# Patient Record
Sex: FEMALE | Race: WHITE | NOT HISPANIC OR LATINO
[De-identification: names, ages, dates, MRNs, and addresses within clinical notes are randomized per-mention and may not be internally consistent; named-entity substitution may affect disease eponyms.]

---

## 2019-12-12 PROBLEM — Z00.00 ENCOUNTER FOR PREVENTIVE HEALTH EXAMINATION: Status: ACTIVE | Noted: 2019-12-12

## 2019-12-13 ENCOUNTER — APPOINTMENT (OUTPATIENT)
Dept: GYNECOLOGIC ONCOLOGY | Facility: CLINIC | Age: 59
End: 2019-12-13
Payer: COMMERCIAL

## 2019-12-13 VITALS
SYSTOLIC BLOOD PRESSURE: 142 MMHG | BODY MASS INDEX: 38.28 KG/M2 | DIASTOLIC BLOOD PRESSURE: 82 MMHG | TEMPERATURE: 98.5 F | WEIGHT: 208 LBS | HEIGHT: 62 IN

## 2019-12-13 DIAGNOSIS — Z86.79 PERSONAL HISTORY OF OTHER DISEASES OF THE CIRCULATORY SYSTEM: ICD-10-CM

## 2019-12-13 DIAGNOSIS — Z86.19 PERSONAL HISTORY OF OTHER INFECTIOUS AND PARASITIC DISEASES: ICD-10-CM

## 2019-12-13 DIAGNOSIS — Z78.9 OTHER SPECIFIED HEALTH STATUS: ICD-10-CM

## 2019-12-13 PROCEDURE — 99204 OFFICE O/P NEW MOD 45 MIN: CPT

## 2019-12-13 RX ORDER — LISINOPRIL 30 MG/1
TABLET ORAL
Refills: 0 | Status: ACTIVE | COMMUNITY

## 2019-12-13 RX ORDER — AMLODIPINE BESYLATE 5 MG/1
5 TABLET ORAL
Refills: 0 | Status: ACTIVE | COMMUNITY

## 2019-12-13 NOTE — HISTORY OF PRESENT ILLNESS
[FreeTextEntry1] : Patient is a 59-year-old female  ( x3, TOP x1) referred by Dr. Bonilla with a large pelvic mass, and right lower quadrant pain. The patient states that she's been feeling crampy for the past several months and was referred to Dr. Bonilla by  her GI doctor after her workup did not reveal any gastrointestinal reason for her symptoms. She denies any  vaginal bleeding, and  is requesting further management.

## 2019-12-13 NOTE — OB HISTORY
[Total Preg ___] : : [unfilled] [Full Term ___] : [unfilled] (full-term) [Living ___] : [unfilled] (living) [ ___] : [unfilled]  section delivery(s) [Menarche Age ____] : age at menarche was [unfilled] [AB Induced ___] : [unfilled] elective (s) [Menopause  Age ____] : menopause occurred at age [unfilled]

## 2019-12-13 NOTE — DISCUSSION/SUMMARY
[FreeTextEntry1] : Patient is a 59-year-old female  ( x3, TOP x1) referred by Dr. Bonilla with a large pelvic mass, and right lower quadrant pain. The patient states that she's been feeling crampy for the past several months. her Ca-125 is 12, and her CEA is 1.5.  She denies any  vaginal bleeding, and  is requesting further management.\par \par Options for surgical management discussed. Procedures offered patient included Exploratory Laparotomy, ZADIA,BSO,possible staging.\par \par The risk benefits and alternative to the recommended treatments were discussed. She was informed about potential complications of surgery including but not limited to bowel, bladder, and ureteral injuries. Infectious morbidity, along with bleeding and thromboembolic events were also discussed.  \par \par She showed clear understanding, was given the opportunity to ask questions and is amenable to the above surgical treatment. The patient will be setup for the above procedure in the near future.\par

## 2019-12-17 ENCOUNTER — OUTPATIENT (OUTPATIENT)
Dept: OUTPATIENT SERVICES | Facility: HOSPITAL | Age: 59
LOS: 1 days | Discharge: HOME | End: 2019-12-17
Payer: COMMERCIAL

## 2019-12-17 VITALS
OXYGEN SATURATION: 100 % | DIASTOLIC BLOOD PRESSURE: 88 MMHG | HEIGHT: 62 IN | SYSTOLIC BLOOD PRESSURE: 150 MMHG | HEART RATE: 72 BPM | TEMPERATURE: 98 F | WEIGHT: 207.23 LBS | RESPIRATION RATE: 18 BRPM

## 2019-12-17 DIAGNOSIS — R19.00 INTRA-ABDOMINAL AND PELVIC SWELLING, MASS AND LUMP, UNSPECIFIED SITE: ICD-10-CM

## 2019-12-17 DIAGNOSIS — Z01.818 ENCOUNTER FOR OTHER PREPROCEDURAL EXAMINATION: ICD-10-CM

## 2019-12-17 DIAGNOSIS — Z98.890 OTHER SPECIFIED POSTPROCEDURAL STATES: Chronic | ICD-10-CM

## 2019-12-17 LAB
ALBUMIN SERPL ELPH-MCNC: 4.4 G/DL — SIGNIFICANT CHANGE UP (ref 3.5–5.2)
ALP SERPL-CCNC: 39 U/L — SIGNIFICANT CHANGE UP (ref 30–115)
ALT FLD-CCNC: 25 U/L — SIGNIFICANT CHANGE UP (ref 0–41)
ANION GAP SERPL CALC-SCNC: 15 MMOL/L — HIGH (ref 7–14)
APPEARANCE UR: CLEAR — SIGNIFICANT CHANGE UP
APTT BLD: 31 SEC — SIGNIFICANT CHANGE UP (ref 27–39.2)
AST SERPL-CCNC: 19 U/L — SIGNIFICANT CHANGE UP (ref 0–41)
BASOPHILS # BLD AUTO: 0.05 K/UL — SIGNIFICANT CHANGE UP (ref 0–0.2)
BASOPHILS NFR BLD AUTO: 0.8 % — SIGNIFICANT CHANGE UP (ref 0–1)
BILIRUB SERPL-MCNC: 0.6 MG/DL — SIGNIFICANT CHANGE UP (ref 0.2–1.2)
BILIRUB UR-MCNC: NEGATIVE — SIGNIFICANT CHANGE UP
BLD GP AB SCN SERPL QL: SIGNIFICANT CHANGE UP
BUN SERPL-MCNC: 18 MG/DL — SIGNIFICANT CHANGE UP (ref 10–20)
CALCIUM SERPL-MCNC: 9.4 MG/DL — SIGNIFICANT CHANGE UP (ref 8.5–10.1)
CHLORIDE SERPL-SCNC: 100 MMOL/L — SIGNIFICANT CHANGE UP (ref 98–110)
CO2 SERPL-SCNC: 25 MMOL/L — SIGNIFICANT CHANGE UP (ref 17–32)
COLOR SPEC: YELLOW — SIGNIFICANT CHANGE UP
CREAT SERPL-MCNC: 0.6 MG/DL — LOW (ref 0.7–1.5)
DIFF PNL FLD: NEGATIVE — SIGNIFICANT CHANGE UP
EOSINOPHIL # BLD AUTO: 0.09 K/UL — SIGNIFICANT CHANGE UP (ref 0–0.7)
EOSINOPHIL NFR BLD AUTO: 1.5 % — SIGNIFICANT CHANGE UP (ref 0–8)
ESTIMATED AVERAGE GLUCOSE: 120 MG/DL — HIGH (ref 68–114)
GLUCOSE SERPL-MCNC: 114 MG/DL — HIGH (ref 70–99)
GLUCOSE UR QL: NEGATIVE — SIGNIFICANT CHANGE UP
HBA1C BLD-MCNC: 5.8 % — HIGH (ref 4–5.6)
HCT VFR BLD CALC: 42.2 % — SIGNIFICANT CHANGE UP (ref 37–47)
HGB BLD-MCNC: 13.8 G/DL — SIGNIFICANT CHANGE UP (ref 12–16)
IMM GRANULOCYTES NFR BLD AUTO: 0.3 % — SIGNIFICANT CHANGE UP (ref 0.1–0.3)
INR BLD: 1.05 RATIO — SIGNIFICANT CHANGE UP (ref 0.65–1.3)
KETONES UR-MCNC: NEGATIVE — SIGNIFICANT CHANGE UP
LEUKOCYTE ESTERASE UR-ACNC: NEGATIVE — SIGNIFICANT CHANGE UP
LYMPHOCYTES # BLD AUTO: 1.91 K/UL — SIGNIFICANT CHANGE UP (ref 1.2–3.4)
LYMPHOCYTES # BLD AUTO: 32 % — SIGNIFICANT CHANGE UP (ref 20.5–51.1)
MCHC RBC-ENTMCNC: 29.1 PG — SIGNIFICANT CHANGE UP (ref 27–31)
MCHC RBC-ENTMCNC: 32.7 G/DL — SIGNIFICANT CHANGE UP (ref 32–37)
MCV RBC AUTO: 89 FL — SIGNIFICANT CHANGE UP (ref 81–99)
MONOCYTES # BLD AUTO: 0.49 K/UL — SIGNIFICANT CHANGE UP (ref 0.1–0.6)
MONOCYTES NFR BLD AUTO: 8.2 % — SIGNIFICANT CHANGE UP (ref 1.7–9.3)
NEUTROPHILS # BLD AUTO: 3.4 K/UL — SIGNIFICANT CHANGE UP (ref 1.4–6.5)
NEUTROPHILS NFR BLD AUTO: 57.2 % — SIGNIFICANT CHANGE UP (ref 42.2–75.2)
NITRITE UR-MCNC: NEGATIVE — SIGNIFICANT CHANGE UP
NRBC # BLD: 0 /100 WBCS — SIGNIFICANT CHANGE UP (ref 0–0)
PH UR: 6 — SIGNIFICANT CHANGE UP (ref 5–8)
PLATELET # BLD AUTO: 238 K/UL — SIGNIFICANT CHANGE UP (ref 130–400)
POTASSIUM SERPL-MCNC: 4.3 MMOL/L — SIGNIFICANT CHANGE UP (ref 3.5–5)
POTASSIUM SERPL-SCNC: 4.3 MMOL/L — SIGNIFICANT CHANGE UP (ref 3.5–5)
PROT SERPL-MCNC: 7.4 G/DL — SIGNIFICANT CHANGE UP (ref 6–8)
PROT UR-MCNC: NEGATIVE — SIGNIFICANT CHANGE UP
PROTHROM AB SERPL-ACNC: 12.1 SEC — SIGNIFICANT CHANGE UP (ref 9.95–12.87)
RBC # BLD: 4.74 M/UL — SIGNIFICANT CHANGE UP (ref 4.2–5.4)
RBC # FLD: 12.4 % — SIGNIFICANT CHANGE UP (ref 11.5–14.5)
SODIUM SERPL-SCNC: 140 MMOL/L — SIGNIFICANT CHANGE UP (ref 135–146)
SP GR SPEC: 1.01 — SIGNIFICANT CHANGE UP (ref 1.01–1.02)
UROBILINOGEN FLD QL: SIGNIFICANT CHANGE UP
WBC # BLD: 5.96 K/UL — SIGNIFICANT CHANGE UP (ref 4.8–10.8)
WBC # FLD AUTO: 5.96 K/UL — SIGNIFICANT CHANGE UP (ref 4.8–10.8)

## 2019-12-17 PROCEDURE — 93010 ELECTROCARDIOGRAM REPORT: CPT

## 2019-12-17 PROCEDURE — 71046 X-RAY EXAM CHEST 2 VIEWS: CPT | Mod: 26

## 2019-12-17 NOTE — H&P PST ADULT - REASON FOR ADMISSION
exploratory laparotomy total abdominal hysterectomy bilateral, salpingectomy, frozen section surgical staging

## 2019-12-17 NOTE — H&P PST ADULT - HISTORY OF PRESENT ILLNESS
59 year old female here 59 year old female here for above procedure, pt had abdominal sono with GI MD who noted + 'tumors' in uterus, pt needs procedure  pt denies cp, sob, torre or palpitations  pt denies urinary frequency, urgency or burning  ex fortino 2 fos

## 2019-12-17 NOTE — H&P PST ADULT - ATTENDING COMMENTS
The patient is a 59-year-old female  ( x3, TOP x1) referred by Dr. Bonilla with a large pelvic mass, and right lower quadrant pain. The patient states that she's been feeling crampy for the past several months. her Ca-125 is 12, and her CEA is 1.5. She denies any vaginal bleeding, and is requesting further management.    Pelvic Exam  Vagina - mucosa atrophic, no lesions noted  Cervix - closed stenotic under symphysis pubis no lesions  Uterus - 20 wk size, nontender, Fixed to ant abd wall  Adnexa - unable to palpate separate from her uterus  Recto Vaginal- confirmatory     Rt pelvic mass 13x9cm  Polypoid endometrial mass at right fundus suspicious for ca.  Cervix stenosis endometrium and cervix distended with blood.    Assessment  Pelvic mass (789.30) (R19.00), in a patient with a large fixed bulky uterus with polypoid lesions  suspicious for Endometrial malignancy.    Plan  Options for surgical management discussed. Procedures offered patient included Exploratory Laparotomy, ZAIDA,BSO,possible staging.    The risk benefits and alternative to the recommended treatments were discussed. She was informed about potential complications of surgery including but not limited to bowel, bladder, and ureteral injuries. Infectious morbidity, along with bleeding and thromboembolic events were also discussed.  She showed clear understanding, was given the opportunity to ask questions and is amenable to the above surgical treatment.

## 2019-12-17 NOTE — H&P PST ADULT - NSICDXPASTMEDICALHX_GEN_ALL_CORE_FT
PAST MEDICAL HISTORY:  HTN (hypertension) PAST MEDICAL HISTORY:  HTN (hypertension)     Obesity PAST MEDICAL HISTORY:  GERD (gastroesophageal reflux disease) currently asymptomatic (12/18/19)    HTN (hypertension)     Obesity

## 2019-12-18 LAB
CULTURE RESULTS: SIGNIFICANT CHANGE UP
SPECIMEN SOURCE: SIGNIFICANT CHANGE UP

## 2019-12-24 PROBLEM — E66.9 OBESITY, UNSPECIFIED: Chronic | Status: ACTIVE | Noted: 2019-12-17

## 2019-12-24 PROBLEM — K21.9 GASTRO-ESOPHAGEAL REFLUX DISEASE WITHOUT ESOPHAGITIS: Chronic | Status: ACTIVE | Noted: 2019-12-17

## 2019-12-24 PROBLEM — I10 ESSENTIAL (PRIMARY) HYPERTENSION: Chronic | Status: ACTIVE | Noted: 2019-12-17

## 2020-01-02 ENCOUNTER — INPATIENT (INPATIENT)
Facility: HOSPITAL | Age: 60
LOS: 1 days | Discharge: HOME | End: 2020-01-04
Attending: SPECIALIST | Admitting: SPECIALIST
Payer: COMMERCIAL

## 2020-01-02 ENCOUNTER — RESULT REVIEW (OUTPATIENT)
Age: 60
End: 2020-01-02

## 2020-01-02 ENCOUNTER — APPOINTMENT (OUTPATIENT)
Dept: GYNECOLOGIC ONCOLOGY | Facility: HOSPITAL | Age: 60
End: 2020-01-02
Payer: COMMERCIAL

## 2020-01-02 VITALS
SYSTOLIC BLOOD PRESSURE: 160 MMHG | RESPIRATION RATE: 20 BRPM | DIASTOLIC BLOOD PRESSURE: 91 MMHG | TEMPERATURE: 98 F | HEIGHT: 62 IN | HEART RATE: 91 BPM | WEIGHT: 205.91 LBS

## 2020-01-02 DIAGNOSIS — Z98.890 OTHER SPECIFIED POSTPROCEDURAL STATES: Chronic | ICD-10-CM

## 2020-01-02 LAB
ABO RH CONFIRMATION: SIGNIFICANT CHANGE UP
ANION GAP SERPL CALC-SCNC: 14 MMOL/L — SIGNIFICANT CHANGE UP (ref 7–14)
BASOPHILS # BLD AUTO: 0.02 K/UL — SIGNIFICANT CHANGE UP (ref 0–0.2)
BASOPHILS NFR BLD AUTO: 0.2 % — SIGNIFICANT CHANGE UP (ref 0–1)
BUN SERPL-MCNC: 17 MG/DL — SIGNIFICANT CHANGE UP (ref 10–20)
CALCIUM SERPL-MCNC: 8.6 MG/DL — SIGNIFICANT CHANGE UP (ref 8.5–10.1)
CHLORIDE SERPL-SCNC: 102 MMOL/L — SIGNIFICANT CHANGE UP (ref 98–110)
CO2 SERPL-SCNC: 23 MMOL/L — SIGNIFICANT CHANGE UP (ref 17–32)
CREAT SERPL-MCNC: 0.8 MG/DL — SIGNIFICANT CHANGE UP (ref 0.7–1.5)
EOSINOPHIL # BLD AUTO: 0 K/UL — SIGNIFICANT CHANGE UP (ref 0–0.7)
EOSINOPHIL NFR BLD AUTO: 0 % — SIGNIFICANT CHANGE UP (ref 0–8)
GLUCOSE BLDC GLUCOMTR-MCNC: 115 MG/DL — HIGH (ref 70–99)
GLUCOSE SERPL-MCNC: 152 MG/DL — HIGH (ref 70–99)
HCT VFR BLD CALC: 37.7 % — SIGNIFICANT CHANGE UP (ref 37–47)
HGB BLD-MCNC: 12.4 G/DL — SIGNIFICANT CHANGE UP (ref 12–16)
IMM GRANULOCYTES NFR BLD AUTO: 0.3 % — SIGNIFICANT CHANGE UP (ref 0.1–0.3)
LYMPHOCYTES # BLD AUTO: 0.78 K/UL — LOW (ref 1.2–3.4)
LYMPHOCYTES # BLD AUTO: 5.9 % — LOW (ref 20.5–51.1)
MAGNESIUM SERPL-MCNC: 2.1 MG/DL — SIGNIFICANT CHANGE UP (ref 1.8–2.4)
MCHC RBC-ENTMCNC: 29 PG — SIGNIFICANT CHANGE UP (ref 27–31)
MCHC RBC-ENTMCNC: 32.9 G/DL — SIGNIFICANT CHANGE UP (ref 32–37)
MCV RBC AUTO: 88.1 FL — SIGNIFICANT CHANGE UP (ref 81–99)
MONOCYTES # BLD AUTO: 0.57 K/UL — SIGNIFICANT CHANGE UP (ref 0.1–0.6)
MONOCYTES NFR BLD AUTO: 4.3 % — SIGNIFICANT CHANGE UP (ref 1.7–9.3)
NEUTROPHILS # BLD AUTO: 11.75 K/UL — HIGH (ref 1.4–6.5)
NEUTROPHILS NFR BLD AUTO: 89.3 % — HIGH (ref 42.2–75.2)
NRBC # BLD: 0 /100 WBCS — SIGNIFICANT CHANGE UP (ref 0–0)
PHOSPHATE SERPL-MCNC: 4.5 MG/DL — SIGNIFICANT CHANGE UP (ref 2.1–4.9)
PLATELET # BLD AUTO: 222 K/UL — SIGNIFICANT CHANGE UP (ref 130–400)
POTASSIUM SERPL-MCNC: 4.2 MMOL/L — SIGNIFICANT CHANGE UP (ref 3.5–5)
POTASSIUM SERPL-SCNC: 4.2 MMOL/L — SIGNIFICANT CHANGE UP (ref 3.5–5)
RBC # BLD: 4.28 M/UL — SIGNIFICANT CHANGE UP (ref 4.2–5.4)
RBC # FLD: 12.4 % — SIGNIFICANT CHANGE UP (ref 11.5–14.5)
SODIUM SERPL-SCNC: 139 MMOL/L — SIGNIFICANT CHANGE UP (ref 135–146)
WBC # BLD: 13.16 K/UL — HIGH (ref 4.8–10.8)
WBC # FLD AUTO: 13.16 K/UL — HIGH (ref 4.8–10.8)

## 2020-01-02 PROCEDURE — 88112 CYTOPATH CELL ENHANCE TECH: CPT | Mod: 26

## 2020-01-02 PROCEDURE — 88305 TISSUE EXAM BY PATHOLOGIST: CPT | Mod: 26

## 2020-01-02 PROCEDURE — 88342 IMHCHEM/IMCYTCHM 1ST ANTB: CPT | Mod: 26

## 2020-01-02 PROCEDURE — 88341 IMHCHEM/IMCYTCHM EA ADD ANTB: CPT | Mod: 26

## 2020-01-02 PROCEDURE — 58951 RESECT OVARIAN MALIGNANCY: CPT

## 2020-01-02 RX ORDER — ONDANSETRON 8 MG/1
4 TABLET, FILM COATED ORAL ONCE
Refills: 0 | Status: DISCONTINUED | OUTPATIENT
Start: 2020-01-02 | End: 2020-01-02

## 2020-01-02 RX ORDER — HYDROMORPHONE HYDROCHLORIDE 2 MG/ML
1 INJECTION INTRAMUSCULAR; INTRAVENOUS; SUBCUTANEOUS
Refills: 0 | Status: DISCONTINUED | OUTPATIENT
Start: 2020-01-02 | End: 2020-01-02

## 2020-01-02 RX ORDER — FAMOTIDINE 10 MG/ML
20 INJECTION INTRAVENOUS
Refills: 0 | Status: DISCONTINUED | OUTPATIENT
Start: 2020-01-02 | End: 2020-01-04

## 2020-01-02 RX ORDER — AMLODIPINE BESYLATE 2.5 MG/1
1 TABLET ORAL
Qty: 0 | Refills: 0 | DISCHARGE

## 2020-01-02 RX ORDER — ACETAMINOPHEN 500 MG
1000 TABLET ORAL ONCE
Refills: 0 | Status: COMPLETED | OUTPATIENT
Start: 2020-01-02 | End: 2020-01-02

## 2020-01-02 RX ORDER — LISINOPRIL 2.5 MG/1
1 TABLET ORAL
Qty: 0 | Refills: 0 | DISCHARGE

## 2020-01-02 RX ORDER — HYDROMORPHONE HYDROCHLORIDE 2 MG/ML
30 INJECTION INTRAMUSCULAR; INTRAVENOUS; SUBCUTANEOUS
Refills: 0 | Status: DISCONTINUED | OUTPATIENT
Start: 2020-01-02 | End: 2020-01-03

## 2020-01-02 RX ORDER — ONDANSETRON 8 MG/1
4 TABLET, FILM COATED ORAL THREE TIMES A DAY
Refills: 0 | Status: DISCONTINUED | OUTPATIENT
Start: 2020-01-02 | End: 2020-01-04

## 2020-01-02 RX ORDER — SODIUM CHLORIDE 9 MG/ML
1000 INJECTION, SOLUTION INTRAVENOUS
Refills: 0 | Status: DISCONTINUED | OUTPATIENT
Start: 2020-01-02 | End: 2020-01-04

## 2020-01-02 RX ORDER — ONDANSETRON 8 MG/1
4 TABLET, FILM COATED ORAL EVERY 6 HOURS
Refills: 0 | Status: DISCONTINUED | OUTPATIENT
Start: 2020-01-02 | End: 2020-01-04

## 2020-01-02 RX ORDER — HYDROMORPHONE HYDROCHLORIDE 2 MG/ML
0.5 INJECTION INTRAMUSCULAR; INTRAVENOUS; SUBCUTANEOUS
Refills: 0 | Status: DISCONTINUED | OUTPATIENT
Start: 2020-01-02 | End: 2020-01-02

## 2020-01-02 RX ORDER — SODIUM CHLORIDE 9 MG/ML
1000 INJECTION, SOLUTION INTRAVENOUS
Refills: 0 | Status: DISCONTINUED | OUTPATIENT
Start: 2020-01-02 | End: 2020-01-02

## 2020-01-02 RX ORDER — LISINOPRIL 2.5 MG/1
20 TABLET ORAL DAILY
Refills: 0 | Status: DISCONTINUED | OUTPATIENT
Start: 2020-01-03 | End: 2020-01-04

## 2020-01-02 RX ORDER — METOCLOPRAMIDE HCL 10 MG
10 TABLET ORAL
Refills: 0 | Status: DISCONTINUED | OUTPATIENT
Start: 2020-01-02 | End: 2020-01-04

## 2020-01-02 RX ORDER — AMLODIPINE BESYLATE 2.5 MG/1
5 TABLET ORAL DAILY
Refills: 0 | Status: DISCONTINUED | OUTPATIENT
Start: 2020-01-03 | End: 2020-01-04

## 2020-01-02 RX ORDER — INFLUENZA VIRUS VACCINE 15; 15; 15; 15 UG/.5ML; UG/.5ML; UG/.5ML; UG/.5ML
0.5 SUSPENSION INTRAMUSCULAR ONCE
Refills: 0 | Status: DISCONTINUED | OUTPATIENT
Start: 2020-01-02 | End: 2020-01-04

## 2020-01-02 RX ORDER — NALOXONE HYDROCHLORIDE 4 MG/.1ML
0.1 SPRAY NASAL
Refills: 0 | Status: DISCONTINUED | OUTPATIENT
Start: 2020-01-02 | End: 2020-01-04

## 2020-01-02 RX ADMIN — Medication 400 MILLIGRAM(S): at 14:09

## 2020-01-02 RX ADMIN — FAMOTIDINE 20 MILLIGRAM(S): 10 INJECTION INTRAVENOUS at 20:09

## 2020-01-02 RX ADMIN — HYDROMORPHONE HYDROCHLORIDE 0.5 MILLIGRAM(S): 2 INJECTION INTRAMUSCULAR; INTRAVENOUS; SUBCUTANEOUS at 13:46

## 2020-01-02 RX ADMIN — Medication 400 MILLIGRAM(S): at 20:03

## 2020-01-02 RX ADMIN — HYDROMORPHONE HYDROCHLORIDE 0.5 MILLIGRAM(S): 2 INJECTION INTRAMUSCULAR; INTRAVENOUS; SUBCUTANEOUS at 13:31

## 2020-01-02 RX ADMIN — Medication 1000 MILLIGRAM(S): at 14:25

## 2020-01-02 RX ADMIN — HYDROMORPHONE HYDROCHLORIDE 30 MILLILITER(S): 2 INJECTION INTRAMUSCULAR; INTRAVENOUS; SUBCUTANEOUS at 14:30

## 2020-01-02 RX ADMIN — HYDROMORPHONE HYDROCHLORIDE 0.5 MILLIGRAM(S): 2 INJECTION INTRAMUSCULAR; INTRAVENOUS; SUBCUTANEOUS at 14:05

## 2020-01-02 RX ADMIN — SODIUM CHLORIDE 100 MILLILITER(S): 9 INJECTION, SOLUTION INTRAVENOUS at 12:55

## 2020-01-02 NOTE — ASU PATIENT PROFILE, ADULT - PMH
GERD (gastroesophageal reflux disease)  currently asymptomatic (12/18/19)  HTN (hypertension)    Obesity

## 2020-01-02 NOTE — BRIEF OPERATIVE NOTE - OPERATION/FINDINGS
EUA: 8-10 week in size uterus, immobile, right ovarian mass around 20 weeks in size, immobile, normal left adnexa, cervix flushed, atrophic vagina  Ex Lap: anterior uterus densely adherent to vesico-peritoneum, endometrial Ca confined to uterine polyp on frozen, right adnexal mass appeared paratubal, around 15 cm, removed intact, benign cystadenoma on frozen section. Normal appearing left ovary and fallopian tube. copious old blood suctioned from vaginal stump, secondary to cervical stenosis causing hematometra

## 2020-01-02 NOTE — BRIEF OPERATIVE NOTE - NSICDXBRIEFPROCEDURE_GEN_ALL_CORE_FT
PROCEDURES:  Lysis of pelvic adhesions 02-Jan-2020 12:47:32  John Barajas  Omentectomy 02-Jan-2020 12:47:21 partial John Barajas  Exploratory laparotomy with total abdominal hysterectomy and bilateral salpingo-oophorectomy 02-Jan-2020 12:47:10  John Barajas

## 2020-01-02 NOTE — BRIEF OPERATIVE NOTE - SPECIMENS
uterus, cervix, vaginal cuff x 2, bilateral fallopian tubes and ovaries, right adnexal mass, pelvic washings, portion of omentum

## 2020-01-02 NOTE — CHART NOTE - NSCHARTNOTEFT_GEN_A_CORE
PACU ANESTHESIA ADMISSION NOTE      Procedure: Lysis of pelvic adhesions  Omentectomy: partial  Exploratory laparotomy with total abdominal hysterectomy and bilateral salpingo-oophorectomy    Post op diagnosis:  Hematometra  Endometrial cancer  Paratubal cyst      ____  Intubated  TV:______       Rate: ______      FiO2: ______    _x___  Patent Airway    __x__  Full return of protective reflexes    __x__  Full recovery from anesthesia / back to baseline     Vitals:   T:   99.5        R:  10                BP: 145/66                 Sat: 100                  P: 85      Mental Status:  ___x_ Awake   ___x__ Alert   _____ Drowsy   _____ Sedated    Nausea/Vomiting:  __x__ NO  ______Yes,   See Post - Op Orders          Pain Scale (0-10):  __0___    Treatment: ____ None    __x__ See Post - Op/PCA Orders    Post - Operative Fluids:   ____ Oral   __x__ See Post - Op Orders    Plan: Discharge:   ____Home       _x____Floor     _____Critical Care    _____  Other:_alert and awake no complications________________    Comments:

## 2020-01-02 NOTE — PATIENT PROFILE ADULT - LEGAL HELP
Continue doxycycline for now  CAT scan ASAP and return to office after to discuss results and determine length of antibiotic therapy  Call office with any new questions or concerns  no

## 2020-01-02 NOTE — BRIEF OPERATIVE NOTE - NSICDXBRIEFPOSTOP_GEN_ALL_CORE_FT
POST-OP DIAGNOSIS:  Endometrial cancer 02-Jan-2020 12:51:12  John Barajas  Paratubal cyst 02-Jan-2020 12:50:36 frozen: cystadenoma, right John Barajas POST-OP DIAGNOSIS:  Hematometra 02-Jan-2020 12:56:47  John Barajas  Endometrial cancer 02-Jan-2020 12:51:12  John Baraajs  Paratubal cyst 02-Jan-2020 12:50:36 frozen: cystadenoma, right John Barajas

## 2020-01-02 NOTE — PROGRESS NOTE ADULT - ASSESSMENT
58 yo P3, h/o HTN, cervical stenosis, 15 cm right adnexal mass, s/p ZAIDA/BSO, omentectomy, pelvic washings, frozen: endometrial Ca confined to polyp, benign right cystadenoma,  cc, POD 0 recovering well, urine outpt adequate    -Continue PCA dilaudid  -AM labs  -f/u UO  -lovenox after AM labs  -clears  -OOB to chair  -reassess    Will inform Dr. Mondragon

## 2020-01-02 NOTE — PROGRESS NOTE ADULT - SUBJECTIVE AND OBJECTIVE BOX
PGY 4 note    patient seen at bedside and evaluated.  Denies abd pain.  No fever/chills, nausea/vomiting, diarrhea, chest pain, SOB, palpitations.  Barrera in.  has not ambulated.  Tolerating ice chips.    ICU Vital Signs Last 24 Hrs  T(C): 37.2 (02 Jan 2020 14:30), Max: 37.6 (02 Jan 2020 12:55)  T(F): 99 (02 Jan 2020 14:30), Max: 99.7 (02 Jan 2020 12:55)  HR: 70 (02 Jan 2020 15:30) (70 - 91)  BP: 103/57 (02 Jan 2020 15:30) (94/55 - 160/91)  BP(mean): --  ABP: --  ABP(mean): --  RR: 16 (02 Jan 2020 15:30) (12 - 23)  SpO2: 98% (02 Jan 2020 15:30) (96% - 100%)    GEN: NAD, AAO x 3  heart: RRR  Lungs; CTAB  Abd: soft, nontender, nondistended, no r/g/r, incision c/d/i, small amount of serosanguinous drainage  SVE: deferred, no blood on chux  Ext: no calf tenderness or edema b/l  Barrera: [id number]: 130 cc, clear    LABS:                        12.4   13.16 )-----------( 222      ( 02 Jan 2020 16:00 )             37.7     Magnesium, Serum: 2.1 mg/dL (01-02 @ 16:00)    01-02-20 @ 16:00      139  |  102  |  17  ----------------------------<  152<H>  4.2   |  23  |  0.8        Ca    8.6      02 Jan 2020 16:00  Phos  4.5     01-02  Mg     2.1     01-02      Medications  acetaminophen  IVPB ..   400 mL/Hr IV Intermittent (01-02-20 @ 14:09)    HYDROmorphone  Injectable   0.5 milliGRAM(s) IV Push (01-02-20 @ 13:31)   0.5 milliGRAM(s) IV Push (01-02-20 @ 13:46)    lactated ringers.   100 mL/Hr IV Continuous (01-02-20 @ 12:54)

## 2020-01-02 NOTE — BRIEF OPERATIVE NOTE - NSICDXBRIEFPREOP_GEN_ALL_CORE_FT
PRE-OP DIAGNOSIS:  Stenosis of cervix 02-Jan-2020 12:52:00  John Barajas  Pelvic mass 02-Jan-2020 12:47:54  John Barajas

## 2020-01-03 LAB
ANION GAP SERPL CALC-SCNC: 12 MMOL/L — SIGNIFICANT CHANGE UP (ref 7–14)
BASOPHILS # BLD AUTO: 0.01 K/UL — SIGNIFICANT CHANGE UP (ref 0–0.2)
BASOPHILS NFR BLD AUTO: 0.1 % — SIGNIFICANT CHANGE UP (ref 0–1)
BUN SERPL-MCNC: 14 MG/DL — SIGNIFICANT CHANGE UP (ref 10–20)
CALCIUM SERPL-MCNC: 8.8 MG/DL — SIGNIFICANT CHANGE UP (ref 8.5–10.1)
CHLORIDE SERPL-SCNC: 101 MMOL/L — SIGNIFICANT CHANGE UP (ref 98–110)
CO2 SERPL-SCNC: 27 MMOL/L — SIGNIFICANT CHANGE UP (ref 17–32)
CREAT SERPL-MCNC: 0.7 MG/DL — SIGNIFICANT CHANGE UP (ref 0.7–1.5)
EOSINOPHIL # BLD AUTO: 0.01 K/UL — SIGNIFICANT CHANGE UP (ref 0–0.7)
EOSINOPHIL NFR BLD AUTO: 0.1 % — SIGNIFICANT CHANGE UP (ref 0–8)
GLUCOSE BLDC GLUCOMTR-MCNC: 120 MG/DL — HIGH (ref 70–99)
GLUCOSE BLDC GLUCOMTR-MCNC: 140 MG/DL — HIGH (ref 70–99)
GLUCOSE SERPL-MCNC: 121 MG/DL — HIGH (ref 70–99)
HCT VFR BLD CALC: 34.5 % — LOW (ref 37–47)
HGB BLD-MCNC: 11.3 G/DL — LOW (ref 12–16)
IMM GRANULOCYTES NFR BLD AUTO: 0.4 % — HIGH (ref 0.1–0.3)
LYMPHOCYTES # BLD AUTO: 1.71 K/UL — SIGNIFICANT CHANGE UP (ref 1.2–3.4)
LYMPHOCYTES # BLD AUTO: 13.1 % — LOW (ref 20.5–51.1)
MAGNESIUM SERPL-MCNC: 2.2 MG/DL — SIGNIFICANT CHANGE UP (ref 1.8–2.4)
MCHC RBC-ENTMCNC: 29.4 PG — SIGNIFICANT CHANGE UP (ref 27–31)
MCHC RBC-ENTMCNC: 32.8 G/DL — SIGNIFICANT CHANGE UP (ref 32–37)
MCV RBC AUTO: 89.8 FL — SIGNIFICANT CHANGE UP (ref 81–99)
MONOCYTES # BLD AUTO: 0.99 K/UL — HIGH (ref 0.1–0.6)
MONOCYTES NFR BLD AUTO: 7.6 % — SIGNIFICANT CHANGE UP (ref 1.7–9.3)
NEUTROPHILS # BLD AUTO: 10.25 K/UL — HIGH (ref 1.4–6.5)
NEUTROPHILS NFR BLD AUTO: 78.7 % — HIGH (ref 42.2–75.2)
NON-GYNECOLOGICAL CYTOLOGY STUDY: SIGNIFICANT CHANGE UP
NRBC # BLD: 0 /100 WBCS — SIGNIFICANT CHANGE UP (ref 0–0)
PHOSPHATE SERPL-MCNC: 3.1 MG/DL — SIGNIFICANT CHANGE UP (ref 2.1–4.9)
PLATELET # BLD AUTO: 197 K/UL — SIGNIFICANT CHANGE UP (ref 130–400)
POTASSIUM SERPL-MCNC: 4.8 MMOL/L — SIGNIFICANT CHANGE UP (ref 3.5–5)
POTASSIUM SERPL-SCNC: 4.8 MMOL/L — SIGNIFICANT CHANGE UP (ref 3.5–5)
RBC # BLD: 3.84 M/UL — LOW (ref 4.2–5.4)
RBC # FLD: 12.4 % — SIGNIFICANT CHANGE UP (ref 11.5–14.5)
SODIUM SERPL-SCNC: 140 MMOL/L — SIGNIFICANT CHANGE UP (ref 135–146)
WBC # BLD: 13.02 K/UL — HIGH (ref 4.8–10.8)
WBC # FLD AUTO: 13.02 K/UL — HIGH (ref 4.8–10.8)

## 2020-01-03 RX ORDER — OXYCODONE HYDROCHLORIDE 5 MG/1
5 TABLET ORAL EVERY 4 HOURS
Refills: 0 | Status: DISCONTINUED | OUTPATIENT
Start: 2020-01-03 | End: 2020-01-04

## 2020-01-03 RX ORDER — IBUPROFEN 200 MG
600 TABLET ORAL EVERY 6 HOURS
Refills: 0 | Status: DISCONTINUED | OUTPATIENT
Start: 2020-01-03 | End: 2020-01-04

## 2020-01-03 RX ORDER — ACETAMINOPHEN 500 MG
975 TABLET ORAL EVERY 6 HOURS
Refills: 0 | Status: DISCONTINUED | OUTPATIENT
Start: 2020-01-03 | End: 2020-01-04

## 2020-01-03 RX ORDER — ENOXAPARIN SODIUM 100 MG/ML
40 INJECTION SUBCUTANEOUS EVERY 24 HOURS
Refills: 0 | Status: DISCONTINUED | OUTPATIENT
Start: 2020-01-03 | End: 2020-01-04

## 2020-01-03 RX ADMIN — Medication 975 MILLIGRAM(S): at 08:19

## 2020-01-03 RX ADMIN — Medication 975 MILLIGRAM(S): at 17:32

## 2020-01-03 RX ADMIN — Medication 600 MILLIGRAM(S): at 17:32

## 2020-01-03 RX ADMIN — LISINOPRIL 20 MILLIGRAM(S): 2.5 TABLET ORAL at 05:49

## 2020-01-03 RX ADMIN — Medication 600 MILLIGRAM(S): at 14:58

## 2020-01-03 RX ADMIN — FAMOTIDINE 20 MILLIGRAM(S): 10 INJECTION INTRAVENOUS at 05:49

## 2020-01-03 RX ADMIN — ENOXAPARIN SODIUM 40 MILLIGRAM(S): 100 INJECTION SUBCUTANEOUS at 11:02

## 2020-01-03 RX ADMIN — Medication 10 MILLIGRAM(S): at 17:33

## 2020-01-03 RX ADMIN — Medication 975 MILLIGRAM(S): at 17:36

## 2020-01-03 RX ADMIN — Medication 975 MILLIGRAM(S): at 14:58

## 2020-01-03 RX ADMIN — Medication 600 MILLIGRAM(S): at 11:59

## 2020-01-03 RX ADMIN — Medication 600 MILLIGRAM(S): at 08:17

## 2020-01-03 RX ADMIN — FAMOTIDINE 20 MILLIGRAM(S): 10 INJECTION INTRAVENOUS at 17:32

## 2020-01-03 RX ADMIN — Medication 600 MILLIGRAM(S): at 17:36

## 2020-01-03 RX ADMIN — AMLODIPINE BESYLATE 5 MILLIGRAM(S): 2.5 TABLET ORAL at 05:49

## 2020-01-03 RX ADMIN — Medication 975 MILLIGRAM(S): at 11:59

## 2020-01-03 NOTE — PROGRESS NOTE ADULT - SUBJECTIVE AND OBJECTIVE BOX
PGY 4 note    patient seen at bedside and evaluated.  Denies abd pain.  No flatus  No fever/chills, nausea/vomiting, diarrhea, chest pain, SOB, palpitations.  Barrera in.  has not ambulated.  Tolerating ice chips and juice    ICU Vital Signs Last 24 Hrs  T(C): 37 (03 Jan 2020 04:00), Max: 37.6 (02 Jan 2020 12:55)  T(F): 98.6 (03 Jan 2020 04:00), Max: 99.7 (02 Jan 2020 12:55)  HR: 79 (03 Jan 2020 04:00) (70 - 91)  BP: 139/67 (03 Jan 2020 04:00) (94/55 - 160/91)  BP(mean): --  ABP: --  ABP(mean): --  RR: 18 (03 Jan 2020 04:00) (11 - 23)  SpO2: 97% (02 Jan 2020 19:45) (96% - 100%)    GEN: NAD, AAO x 3  heart: RRR  Lungs; CTAB  Abd: soft, nontender, nondistended, no r/g/r, incision c/d/i, small amount of serosanguinous drainage, +BS  SVE: deferred, no blood on chux  Ext: no calf tenderness or edema b/l  Barrera: [id number] 1250 cc    Medications  acetaminophen  IVPB ..   400 mL/Hr IV Intermittent (01-02-20 @ 14:09)    acetaminophen  IVPB ..   400 mL/Hr IV Intermittent (01-02-20 @ 20:03)    amLODIPine   Tablet   5 milliGRAM(s) Oral (01-03-20 @ 05:49)    famotidine    Tablet   20 milliGRAM(s) Oral (01-02-20 @ 20:09)   20 milliGRAM(s) Oral (01-03-20 @ 05:49)    HYDROmorphone  Injectable   0.5 milliGRAM(s) IV Push (01-02-20 @ 13:31)   0.5 milliGRAM(s) IV Push (01-02-20 @ 13:46)    lactated ringers.   100 mL/Hr IV Continuous (01-02-20 @ 12:54)    lisinopril   20 milliGRAM(s) Oral (01-03-20 @ 05:49)          LABS:                        12.4   13.16 )-----------( 222      ( 02 Jan 2020 16:00 )             37.7     Magnesium, Serum: 2.1 mg/dL (01-02 @ 16:00)    01-02-20 @ 16:00      139  |  102  |  17  ----------------------------<  152<H>  4.2   |  23  |  0.8        Ca    8.6      02 Jan 2020 16:00  Phos  4.5     01-02  Mg     2.1     01-02

## 2020-01-03 NOTE — PROGRESS NOTE ADULT - ASSESSMENT
58 yo P3, h/o HTN, cervical stenosis, 15 cm right adnexal mass, s/p ZAIDA/BSO, omentectomy, pelvic washings, frozen: endometrial Ca confined to polyp, benign right cystadenoma,  cc, POD 1 recovering well, urine outpt adequate    -dc PCA pump and switch to ERAS  -AM labs  -TOV by 1300  -lovenox after AM labs  -regular diet  -ambulation  -GI ppx      Will inform Dr. Mondragon

## 2020-01-03 NOTE — PROGRESS NOTE ADULT - ATTENDING COMMENTS
58 yo , h/o HTN, cervical stenosis, 15 cm right adnexal mass, s/p ZAIDA/BSO, omentectomy, pelvic washings, frozen: endometrial Ca confined to polyp, benign right cystadenoma,  cc, POD 1 recovering well, urine outpt adequate.    Bedside exam   abd soft minimal bowel sounds  Incision intact  with minimal serous drainage  Pain well controlled with PCA.     I agree with above resident plan and assessment.

## 2020-01-03 NOTE — PROGRESS NOTE ADULT - REASON FOR ADMISSION
59-year-old female  ( x3, TOP x1)  with a large 15cm pelvic mass, and right lower quadrant pain along with cervical stenosis and hematometra s/p ZAIDA /BSO Omentectomy pelvic washings for endometrial ca confined to a polyp along with a benign serous-cystadenoma.

## 2020-01-04 ENCOUNTER — TRANSCRIPTION ENCOUNTER (OUTPATIENT)
Age: 60
End: 2020-01-04

## 2020-01-04 VITALS
HEART RATE: 76 BPM | TEMPERATURE: 98 F | RESPIRATION RATE: 14 BRPM | SYSTOLIC BLOOD PRESSURE: 130 MMHG | DIASTOLIC BLOOD PRESSURE: 68 MMHG

## 2020-01-04 RX ORDER — SIMETHICONE 80 MG/1
1 TABLET, CHEWABLE ORAL
Qty: 56 | Refills: 0
Start: 2020-01-04 | End: 2020-01-17

## 2020-01-04 RX ORDER — IBUPROFEN 200 MG
1 TABLET ORAL
Qty: 56 | Refills: 0
Start: 2020-01-04 | End: 2020-01-17

## 2020-01-04 RX ORDER — ACETAMINOPHEN 500 MG
2 TABLET ORAL
Qty: 112 | Refills: 0
Start: 2020-01-04 | End: 2020-01-17

## 2020-01-04 RX ORDER — DOCUSATE SODIUM 100 MG
1 CAPSULE ORAL
Qty: 28 | Refills: 0
Start: 2020-01-04 | End: 2020-01-17

## 2020-01-04 RX ADMIN — LISINOPRIL 20 MILLIGRAM(S): 2.5 TABLET ORAL at 05:57

## 2020-01-04 RX ADMIN — AMLODIPINE BESYLATE 5 MILLIGRAM(S): 2.5 TABLET ORAL at 05:57

## 2020-01-04 RX ADMIN — Medication 10 MILLIGRAM(S): at 05:59

## 2020-01-04 RX ADMIN — Medication 600 MILLIGRAM(S): at 00:56

## 2020-01-04 RX ADMIN — Medication 975 MILLIGRAM(S): at 05:57

## 2020-01-04 RX ADMIN — Medication 975 MILLIGRAM(S): at 00:26

## 2020-01-04 RX ADMIN — Medication 975 MILLIGRAM(S): at 00:56

## 2020-01-04 RX ADMIN — Medication 600 MILLIGRAM(S): at 05:57

## 2020-01-04 RX ADMIN — FAMOTIDINE 20 MILLIGRAM(S): 10 INJECTION INTRAVENOUS at 06:00

## 2020-01-04 RX ADMIN — Medication 600 MILLIGRAM(S): at 00:26

## 2020-01-04 RX ADMIN — ENOXAPARIN SODIUM 40 MILLIGRAM(S): 100 INJECTION SUBCUTANEOUS at 08:07

## 2020-01-04 RX ADMIN — SODIUM CHLORIDE 125 MILLILITER(S): 9 INJECTION, SOLUTION INTRAVENOUS at 05:55

## 2020-01-04 NOTE — PROGRESS NOTE ADULT - SUBJECTIVE AND OBJECTIVE BOX
PGY 2 progress note    Subjective: Pt seen and examined at bedside. Doing well, pain controlled, no events overnight. Pt is voiding without difficulty, passing flatus no BM, ambulating, tolerating regular diet. Denies fever, chills, CP, SOB, N/V, LE pain, vaginal bleeding.    Physical exam:    Vital Signs Last 24 Hrs  T(F): 98.5 (04 Jan 2020 07:30), Max: 98.5 (04 Jan 2020 07:30)  HR: 76 (04 Jan 2020 07:30) (69 - 76)  BP: 130/68 (04 Jan 2020 07:30) (119/59 - 131/71)  RR: 14 (04 Jan 2020 07:30) (14 - 18)    Gen: NAD  CVS: s1s2, rrr  Lungs: ctab, no r/r/w  Abdomen: Soft, appropriately tender, no distension, +BS  Incision: dermabond in place, c/d/i  Pelvic: No VB on amari  Ext: No calf tenderness    Diet: Regular  meds:   acetaminophen   Tablet ..   975 milliGRAM(s) Oral (01-04-20 @ 05:57)   975 milliGRAM(s) Oral (01-04-20 @ 00:26)   975 milliGRAM(s) Oral (01-03-20 @ 17:32)   975 milliGRAM(s) Oral (01-03-20 @ 14:58)    amLODIPine   Tablet   5 milliGRAM(s) Oral (01-04-20 @ 05:57)    enoxaparin Injectable   40 milliGRAM(s) SubCutaneous (01-04-20 @ 08:07)   40 milliGRAM(s) SubCutaneous (01-03-20 @ 11:02)    famotidine    Tablet   20 milliGRAM(s) Oral (01-04-20 @ 06:00)   20 milliGRAM(s) Oral (01-03-20 @ 17:32)    ibuprofen  Tablet.   600 milliGRAM(s) Oral (01-04-20 @ 05:57)   600 milliGRAM(s) Oral (01-04-20 @ 00:26)   600 milliGRAM(s) Oral (01-03-20 @ 17:32)   600 milliGRAM(s) Oral (01-03-20 @ 14:58)    lisinopril   20 milliGRAM(s) Oral (01-04-20 @ 05:57)    metoclopramide Injectable   10 milliGRAM(s) IV Push (01-04-20 @ 05:59)   10 milliGRAM(s) IV Push (01-03-20 @ 17:33)        LABS:                        11.3   13.02 )-----------( 197      ( 03 Jan 2020 06:34 )             34.5                         12.4   13.16 )-----------( 222      ( 02 Jan 2020 16:00 )             37.7       01-03-20 @ 06:34      140  |  101  |  14  ----------------------------<  121<H>  4.8   |  27  |  0.7    01-02-20 @ 16:00      139  |  102  |  17  ----------------------------<  152<H>  4.2   |  23  |  0.8        Ca    8.8      03 Jan 2020 06:34  Ca    8.6      02 Jan 2020 16:00  Phos  3.1     01-03  Phos  4.5     01-02  Mg     2.2     01-03  Mg     2.1     01-02

## 2020-01-04 NOTE — DISCHARGE NOTE PROVIDER - NSDCMRMEDTOKEN_GEN_ALL_CORE_FT
amLODIPine 5 mg oral tablet: 1 tab(s) orally once a day  Colace 100 mg oral capsule: 1 cap(s) orally 2 times a day   ibuprofen 600 mg oral tablet: 1 tab(s) orally every 6 hours   lisinopril 20 mg oral tablet: 1 tab(s) orally once a day  simethicone 80 mg oral tablet: 1 tab(s) orally every 6 hours   Tylenol 325 mg oral capsule: 2 cap(s) orally every 6 hours

## 2020-01-04 NOTE — DISCHARGE NOTE PROVIDER - NSDCFUADDINST_GEN_ALL_CORE_FT
Nothing in the vagina for 6 weeks (no sex, no tampons, no douching). Avoid tub baths, you may shower.    If you have a fever of 100.4F or greater, severe vaginal bleeding, or severe abdominal pain, call your Ob/Gyn or come to the emergency department immediately.

## 2020-01-04 NOTE — DISCHARGE NOTE PROVIDER - NSDCCPCAREPLAN_GEN_ALL_CORE_FT
PRINCIPAL DISCHARGE DIAGNOSIS  Diagnosis: S/P total abdominal hysterectomy and bilateral salpingo-oophorectomy  Assessment and Plan of Treatment:

## 2020-01-04 NOTE — DISCHARGE NOTE PROVIDER - HOSPITAL COURSE
01-04-20 @ 08:57        HPI:            PAST MEDICAL & SURGICAL HISTORY:    GERD (gastroesophageal reflux disease): currently asymptomatic (12/18/19)    Obesity    HTN (hypertension)    History of surgery: c section times 3            POSTOPERATIVE COURSE:     pt had uncomplicated ZAIDA/BSO, omentectomy, pelvic washings, frozen demonstrated endometrial CA confined to polyp, benign right cystadenoma, EBL 30cc    pt recovered well, ambulated, passed flatus, tolerated regular diet, voided, discharged POD2                 LABS:                            11.3     13.02 )-----------( 197      ( 03 Jan 2020 06:34 )               34.5                             12.4     13.16 )-----------( 222      ( 02 Jan 2020 16:00 )               37.7             01-03-20 @ 06:34            140  |  101  |  14    ----------------------------<  121<H>    4.8   |  27  |  0.7        01-02-20 @ 16:00            139  |  102  |  17    ----------------------------<  152<H>    4.2   |  23  |  0.8                Ca    8.8      03 Jan 2020 06:34    Ca    8.6      02 Jan 2020 16:00    Phos  3.1     01-03    Phos  4.5     01-02    Mg     2.2     01-03    Mg     2.1     01-02                    Allergies        No Known Allergies        Intolerances

## 2020-01-04 NOTE — DISCHARGE NOTE NURSING/CASE MANAGEMENT/SOCIAL WORK - PATIENT PORTAL LINK FT
You can access the FollowMyHealth Patient Portal offered by [facility] by registering at the following website: [email or web address]. By joining [facility]’s FollowMyHealth portal, you will also be able to view your health information using other applications (apps) compatible with our system.

## 2020-01-04 NOTE — PROGRESS NOTE ADULT - ASSESSMENT
58 yo P3, h/o HTN, cervical stenosis, 15 cm right adnexal mass, s/p ZAIDA/BSO, omentectomy, pelvic washings, frozen: endometrial Ca confined to polyp, benign right cystadenoma,  cc, POD 2, recovering well.    - pain management PRN  - regular diet, PO hydration  - monitor vitals  - encourage ambulation, incentive spirometry  - lovenox for DVT ppx  - anticipate d/c today    Dr. Sarabia aware and Dr. Mondragon to be made aware.

## 2020-01-04 NOTE — DISCHARGE NOTE PROVIDER - CARE PROVIDER_API CALL
Susan Mondragon)  Gynecologic Oncology; Obstetrics and Gynecology  [address] [place] [address], [address] [place] [address]  Phone: [phone]  Fax: [phone]  Follow Up Time: 2 weeks

## 2020-01-14 LAB — SURGICAL PATHOLOGY STUDY: SIGNIFICANT CHANGE UP

## 2020-01-16 DIAGNOSIS — E66.9 OBESITY, UNSPECIFIED: ICD-10-CM

## 2020-01-16 DIAGNOSIS — I10 ESSENTIAL (PRIMARY) HYPERTENSION: ICD-10-CM

## 2020-01-16 DIAGNOSIS — N88.2 STRICTURE AND STENOSIS OF CERVIX UTERI: ICD-10-CM

## 2020-01-16 DIAGNOSIS — R19.00 INTRA-ABDOMINAL AND PELVIC SWELLING, MASS AND LUMP, UNSPECIFIED SITE: ICD-10-CM

## 2020-01-16 DIAGNOSIS — N32.89 OTHER SPECIFIED DISORDERS OF BLADDER: ICD-10-CM

## 2020-01-16 DIAGNOSIS — K21.9 GASTRO-ESOPHAGEAL REFLUX DISEASE WITHOUT ESOPHAGITIS: ICD-10-CM

## 2020-01-16 DIAGNOSIS — C54.1 MALIGNANT NEOPLASM OF ENDOMETRIUM: ICD-10-CM

## 2020-01-16 DIAGNOSIS — Z98.890 OTHER SPECIFIED POSTPROCEDURAL STATES: ICD-10-CM

## 2020-01-16 DIAGNOSIS — N83.8 OTHER NONINFLAMMATORY DISORDERS OF OVARY, FALLOPIAN TUBE AND BROAD LIGAMENT: ICD-10-CM

## 2020-01-16 DIAGNOSIS — D28.2 BENIGN NEOPLASM OF UTERINE TUBES AND LIGAMENTS: ICD-10-CM

## 2020-01-16 DIAGNOSIS — N95.2 POSTMENOPAUSAL ATROPHIC VAGINITIS: ICD-10-CM

## 2020-01-17 ENCOUNTER — APPOINTMENT (OUTPATIENT)
Dept: GYNECOLOGIC ONCOLOGY | Facility: CLINIC | Age: 60
End: 2020-01-17
Payer: COMMERCIAL

## 2020-01-17 VITALS
BODY MASS INDEX: 37.54 KG/M2 | SYSTOLIC BLOOD PRESSURE: 144 MMHG | DIASTOLIC BLOOD PRESSURE: 82 MMHG | WEIGHT: 204 LBS | TEMPERATURE: 98.4 F | HEIGHT: 62 IN

## 2020-01-17 PROCEDURE — 99213 OFFICE O/P EST LOW 20 MIN: CPT

## 2020-01-17 NOTE — DISCUSSION/SUMMARY
[Clean] : was clean [Dry] : was dry [Intact] : was intact [Erythema] : was not erythematous [Ecchymosis] : was not ecchymotic [None] : had no drainage [Seroma] : had no seroma [Normal Skin] : normal appearance [Firm] : soft [Tender] : nontender [Abnormal Bowel Sounds] : normal bowel sounds [Rebound] : no rebound tenderness [Guarding] : no guarding [Incisional Hernia] : no incisional hernia [Vaginal Exam Abnormal] : normal vaginal exam [External Genitalia Abnormal] : normal external genitalia [Doing Well] : is doing well [Excellent Pain Control] : has excellent pain control

## 2020-01-17 NOTE — ASSESSMENT
[FreeTextEntry1] : 59-year-old female  ( x3, TOP x1) referred by Dr. Bonilla with a large pelvic mass, and right lower quadrant pain. 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 This was confirmed on the final pathology report. She presents today for her first postoperative visit. Her pathology was reviewed with her. She appears to be recovering well and requires no adjuvant therapy.\par \par PLAN\par -F/U Visit in 2 months \par \par \par

## 2020-01-17 NOTE — REASON FOR VISIT
[Post Op] : post op visit [de-identified] : January 2, 2020 [de-identified] : ZAIDA/BSO For ENDOMETRIAL CANCER GRADE I

## 2020-05-12 ENCOUNTER — APPOINTMENT (OUTPATIENT)
Dept: GYNECOLOGIC ONCOLOGY | Facility: CLINIC | Age: 60
End: 2020-05-12

## 2020-11-12 NOTE — PAST MEDICAL HISTORY
[Total Preg ___] : G[unfilled] [Live Births ___] : P[unfilled]  [Living ___] : Living: [unfilled] [AB Induced ___] : elective abortions: [unfilled]

## 2020-11-17 ENCOUNTER — APPOINTMENT (OUTPATIENT)
Dept: GYNECOLOGIC ONCOLOGY | Facility: CLINIC | Age: 60
End: 2020-11-17
Payer: COMMERCIAL

## 2020-11-17 VITALS
DIASTOLIC BLOOD PRESSURE: 80 MMHG | WEIGHT: 204 LBS | SYSTOLIC BLOOD PRESSURE: 136 MMHG | BODY MASS INDEX: 37.54 KG/M2 | TEMPERATURE: 98.4 F | HEIGHT: 62 IN

## 2020-11-17 DIAGNOSIS — B96.89 ACUTE VAGINITIS: ICD-10-CM

## 2020-11-17 DIAGNOSIS — N76.0 ACUTE VAGINITIS: ICD-10-CM

## 2020-11-17 PROCEDURE — 99213 OFFICE O/P EST LOW 20 MIN: CPT

## 2020-11-17 RX ORDER — METRONIDAZOLE 7.5 MG/G
0.75 GEL VAGINAL
Qty: 1 | Refills: 1 | Status: ACTIVE | COMMUNITY
Start: 2020-11-17 | End: 1900-01-01

## 2020-11-17 RX ORDER — FLUOROURACIL 5 MG/G
0.5 CREAM TOPICAL
Qty: 1 | Refills: 1 | Status: ACTIVE | COMMUNITY
Start: 2020-11-17 | End: 1900-01-01

## 2020-11-17 NOTE — ASSESSMENT
[FreeTextEntry1] : 60 -year-old female  ( x3, TOP x1) referred by Dr. Bonilla with a large pelvic mass, and right lower quadrant pain. 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 This was confirmed on the final pathology report. 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

## 2020-11-17 NOTE — HISTORY OF PRESENT ILLNESS
[FreeTextEntry1] : 60 year old patient  ( x3, TOP x1)referred back by Dr. Bonilla for HGSIL of vaginal apex with +HPV.   I performed ZAIDA/BSO on 2020 for FIGO Stage 1 endometrial cancer confined to a polyp.  The patient had an uneventful post operative recovery.  She was returned to Dr. Bonilla for her annual GYN.   A vaginal pap was done on 10/29/20 and revealed epithelial cell abnormality, HGSIL with +HPV.  Patient has a history of HPV and states she had cryotherapy many years ago.  \par She returns for evaluation of abnormal pap test.

## 2020-12-23 PROBLEM — N76.0 BV (BACTERIAL VAGINOSIS): Status: RESOLVED | Noted: 2020-11-17 | Resolved: 2020-12-23

## 2021-02-02 ENCOUNTER — APPOINTMENT (OUTPATIENT)
Dept: GYNECOLOGIC ONCOLOGY | Facility: CLINIC | Age: 61
End: 2021-02-02

## 2021-02-26 ENCOUNTER — RESULT REVIEW (OUTPATIENT)
Age: 61
End: 2021-02-26

## 2021-02-26 ENCOUNTER — APPOINTMENT (OUTPATIENT)
Dept: GYNECOLOGIC ONCOLOGY | Facility: CLINIC | Age: 61
End: 2021-02-26
Payer: COMMERCIAL

## 2021-02-26 VITALS
BODY MASS INDEX: 37.54 KG/M2 | HEIGHT: 62 IN | TEMPERATURE: 97.5 F | DIASTOLIC BLOOD PRESSURE: 86 MMHG | SYSTOLIC BLOOD PRESSURE: 122 MMHG | WEIGHT: 204 LBS

## 2021-02-26 DIAGNOSIS — Z87.898 PERSONAL HISTORY OF OTHER SPECIFIED CONDITIONS: ICD-10-CM

## 2021-02-26 PROCEDURE — 99072 ADDL SUPL MATRL&STAF TM PHE: CPT

## 2021-02-26 PROCEDURE — 99213 OFFICE O/P EST LOW 20 MIN: CPT

## 2021-02-26 NOTE — HISTORY OF PRESENT ILLNESS
[FreeTextEntry1] : 61 year old patient  ( x3, TOP x1) presents for follow up after a course of 5FU treatment per vagina.   S/P ZAIDA/BSO on 2020 for Figo Stage 1 endometrial cancer confined to a polyp.  On follow up GYN exam a vaginal pap was done by Dr. 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

## 2021-02-26 NOTE — ASSESSMENT
[FreeTextEntry1] : 61 year old patient  ( x3, TOP x1) referred back by Dr. Bonilla for HGSIL of vaginal apex with +HPV. I performed ZAIDA/BSO on 2020 for FIGO Stage 1 endometrial cancer confined to a polyp. The patient had an uneventful post operative recovery. She was returned to Dr. Bonilla for her annual GYN. A vaginal pap was done on 10/29/20 and revealed epithelial cell abnormality, HGSIL with +HPV. Patient has a history of HPV and states she had cryotherapy many years ago. \par She returns for evaluation after completing 5FU therapy. \par

## 2021-06-29 ENCOUNTER — RESULT REVIEW (OUTPATIENT)
Age: 61
End: 2021-06-29

## 2021-06-29 ENCOUNTER — APPOINTMENT (OUTPATIENT)
Dept: GYNECOLOGIC ONCOLOGY | Facility: CLINIC | Age: 61
End: 2021-06-29
Payer: COMMERCIAL

## 2021-06-29 VITALS
TEMPERATURE: 98.7 F | SYSTOLIC BLOOD PRESSURE: 126 MMHG | BODY MASS INDEX: 41.96 KG/M2 | DIASTOLIC BLOOD PRESSURE: 84 MMHG | HEIGHT: 62 IN | WEIGHT: 228 LBS

## 2021-06-29 PROCEDURE — 99072 ADDL SUPL MATRL&STAF TM PHE: CPT

## 2021-06-29 PROCEDURE — 99213 OFFICE O/P EST LOW 20 MIN: CPT

## 2021-06-29 NOTE — ASSESSMENT
[FreeTextEntry1] : 61 year old patient of Dr. Deandre Bonilla presents for follow up.  Patient had ZAIDA/BSO on January 2, 2020 for endometrial cancer Figo Stage 1 confined to a polyp.   A pap by Dr. Bonilla revealed epithelial  cell abnormality,    HGSIL with HPV.   She was treated with Efudex (5FU) cream  intravaginally and her repeat pap in February 2021 reported epithelial cell abnormality.   She returns for re-evaluation.

## 2021-11-19 NOTE — DISCHARGE NOTE PROVIDER - REASON FOR ADMISSION
ZAIDA/BSO
I saw and evaluated the patient. I reviewed the trainee's note and agree with  findings and plan as documented with modifications that were made to above trainee note where appropriate and/or are addressed below in case summary.

## 2022-01-04 ENCOUNTER — RESULT REVIEW (OUTPATIENT)
Age: 62
End: 2022-01-04

## 2022-01-04 ENCOUNTER — APPOINTMENT (OUTPATIENT)
Dept: GYNECOLOGIC ONCOLOGY | Facility: CLINIC | Age: 62
End: 2022-01-04
Payer: COMMERCIAL

## 2022-01-04 ENCOUNTER — NON-APPOINTMENT (OUTPATIENT)
Age: 62
End: 2022-01-04

## 2022-01-04 VITALS
BODY MASS INDEX: 41.96 KG/M2 | TEMPERATURE: 97.2 F | SYSTOLIC BLOOD PRESSURE: 130 MMHG | DIASTOLIC BLOOD PRESSURE: 70 MMHG | HEIGHT: 62 IN | WEIGHT: 228 LBS

## 2022-01-04 PROCEDURE — 99213 OFFICE O/P EST LOW 20 MIN: CPT

## 2022-01-04 NOTE — HISTORY OF PRESENT ILLNESS
[FreeTextEntry1] : 61 year old patient of Dr. Deandre Bonilla presents for follow up of stage I A ECA.  Patient had ZAIDA/BSO on January 2, 2020 for endometrial cancer Figo Stage 1 confined to a polyp.   A pap by Dr. Bonilla revealed epithelial  cell abnormality,    HGSIL with HPV.   She was treated with Efudex (5FU) cream  intravaginally and her repeat pap in June 2021 reported epithelial cell abnormality c/w LGSIL.   She returns for re-evaluation.

## 2022-01-04 NOTE — ASSESSMENT
[FreeTextEntry1] : 61 year old patient of Dr. Deandre Bonilla presents for follow up.  Patient had ZAIDA/BSO on January 2, 2020 for endometrial cancer Figo Stage 1 confined to a polyp.   A pap by Dr. Bonilla revealed epithelial  cell abnormality,    HGSIL with HPV.   She was treated with Efudex (5FU) cream  intravaginally and her repeat pap in June 2021 reported low grade epithelial cell abnormality.   She returns for re-evaluation.

## 2022-07-12 ENCOUNTER — APPOINTMENT (OUTPATIENT)
Dept: GYNECOLOGIC ONCOLOGY | Facility: CLINIC | Age: 62
End: 2022-07-12

## 2022-07-12 ENCOUNTER — RESULT REVIEW (OUTPATIENT)
Age: 62
End: 2022-07-12

## 2022-07-12 VITALS
WEIGHT: 226 LBS | DIASTOLIC BLOOD PRESSURE: 82 MMHG | HEIGHT: 62 IN | SYSTOLIC BLOOD PRESSURE: 146 MMHG | BODY MASS INDEX: 41.59 KG/M2

## 2022-07-12 DIAGNOSIS — N89.1 MODERATE VAGINAL DYSPLASIA: ICD-10-CM

## 2022-07-12 PROCEDURE — 99213 OFFICE O/P EST LOW 20 MIN: CPT

## 2022-07-18 NOTE — HISTORY OF PRESENT ILLNESS
[FreeTextEntry1] : 62 year old patient of Dr. Deandre Bonilla presents for follow up of stage I A ECA. Patient had ZAIDA/BSO on January 2, 2020 for endometrial cancer Figo Stage 1 confined to a polyp. A pap by Dr. Bonilla revealed epithelial cell abnormality, HGSIL with HPV. She was treated with Efudex (5FU) cream intravaginally and her repeat pap in June 2021 and January 2022 was  reported as epithelial cell abnormality c/w LGSIL. She returns for re-evaluation along with a repeat pap.

## 2022-08-02 ENCOUNTER — NON-APPOINTMENT (OUTPATIENT)
Age: 62
End: 2022-08-02

## 2023-01-06 ENCOUNTER — APPOINTMENT (OUTPATIENT)
Dept: GYNECOLOGIC ONCOLOGY | Facility: CLINIC | Age: 63
End: 2023-01-06
Payer: COMMERCIAL

## 2023-01-06 ENCOUNTER — RESULT REVIEW (OUTPATIENT)
Age: 63
End: 2023-01-06

## 2023-01-06 VITALS
HEIGHT: 62 IN | DIASTOLIC BLOOD PRESSURE: 82 MMHG | BODY MASS INDEX: 41.77 KG/M2 | SYSTOLIC BLOOD PRESSURE: 148 MMHG | WEIGHT: 227 LBS

## 2023-01-06 PROCEDURE — 99214 OFFICE O/P EST MOD 30 MIN: CPT

## 2023-01-06 NOTE — ASSESSMENT
[FreeTextEntry1] : 62 year old female with a history of ECA as well as persistent VAIN I.  (LGSIL). She returns for her 6 month follow up.

## 2023-07-18 ENCOUNTER — APPOINTMENT (OUTPATIENT)
Dept: GYNECOLOGIC ONCOLOGY | Facility: CLINIC | Age: 63
End: 2023-07-18
Payer: COMMERCIAL

## 2023-07-18 ENCOUNTER — LABORATORY RESULT (OUTPATIENT)
Age: 63
End: 2023-07-18

## 2023-07-18 DIAGNOSIS — Z90.710 ACQUIRED ABSENCE OF BOTH CERVIX AND UTERUS: ICD-10-CM

## 2023-07-18 PROCEDURE — 99213 OFFICE O/P EST LOW 20 MIN: CPT

## 2023-07-18 NOTE — HISTORY OF PRESENT ILLNESS
[FreeTextEntry1] : 63 year old patient of Dr. Deandre Bonilla presents for follow up of stage I A ECA. Patient had ZAIDA/BSO on January 2, 2020 for endometrial cancer Figo Stage 1 confined to a polyp. A pap by Dr. Bonilla revealed epithelial cell abnormality, HGSIL with HPV. She was treated with Efudex (5FU) cream intravaginally and her repeat pap in June 2021 and January 2022 was reported as epithelial cell abnormality c/w LGSIL. Her most recent pap from July 2022 confirms Persistent LGSIL. The patient is otherwise asymptomatic and her colposcopic exam reveals no lesions. She returns for her 6 month follow up. \par \par Pt last seen in GYN/ONC 1/6/23 where mammography was ordered and pap completed.\par Pap 1/6/23:\par Collected Date/Time:                   1/6/2023 09:09 EST\par Received Date/Time:                    1/9/2023 14:58 EST\par \par Gynecologic Report - Auth (Verified)\par \par Statement of Adequacy\par Satisfactory for evaluation.\par \par Final Diagnosis\par EPITHELIAL CELL ABNORMALITY\par Low grade squamous intraepithelial lesion (LSIL)\par \par Screened by: Willy Melchor MS, CT(ASCP)\par Verified by: Mis Grewal M.D.\par (Electronic Signature)\par Reported on: 01/15/23 17:39 EST, [facility],\par [facility], [facility], [place] [address]\par Phone: [phone]   Fax: [phone]\par _________________________________________________________________\par \par Specimen(s) Submitted\par \par THINPREP WITH HPV - Vaginal\par \par Clinical Information\par LMP: 50\par 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

## 2023-07-18 NOTE — ASSESSMENT
[FreeTextEntry1] : 63 year old patient of Dr. Deandre Bonilla presents for follow up of stage I A ECA. Patient had ZAIDA/BSO on January 2, 2020 for endometrial cancer Figo Stage 1 confined to a polyp. A pap by Dr. Bonilla revealed epithelial cell abnormality, HGSIL with HPV. She was treated with Efudex (5FU) cream intravaginally and her repeat pap in June 2021 and January 2022 was reported as epithelial cell abnormality c/w LGSIL. Her most recent pap from \par Collected Date/Time:                   1/6/2023 09:09 EST\par Received Date/Time:                    1/9/2023 14:58 EST\par \par Gynecologic Report - Auth (Verified)\par \par Statement of Adequacy\par Satisfactory for evaluation.\par \par Final Diagnosis\par EPITHELIAL CELL ABNORMALITY\par Low grade squamous intraepithelial lesion (LSIL)\par \par The patient is otherwise asymptomatic and her colposcopic exam reveals no lesions. She returns for her 6 month follow up. \par

## 2023-07-24 LAB — CYTOLOGY CVX/VAG DOC THIN PREP: ABNORMAL

## 2024-01-23 ENCOUNTER — APPOINTMENT (OUTPATIENT)
Dept: GYNECOLOGIC ONCOLOGY | Facility: CLINIC | Age: 64
End: 2024-01-23
Payer: COMMERCIAL

## 2024-01-23 ENCOUNTER — LABORATORY RESULT (OUTPATIENT)
Age: 64
End: 2024-01-23

## 2024-01-23 VITALS
HEART RATE: 83 BPM | HEIGHT: 62 IN | SYSTOLIC BLOOD PRESSURE: 112 MMHG | WEIGHT: 227 LBS | BODY MASS INDEX: 41.77 KG/M2 | DIASTOLIC BLOOD PRESSURE: 89 MMHG

## 2024-01-23 DIAGNOSIS — N89.0 MILD VAGINAL DYSPLASIA: ICD-10-CM

## 2024-01-23 DIAGNOSIS — Z85.42 PERSONAL HISTORY OF MALIGNANT NEOPLASM OF OTHER PARTS OF UTERUS: ICD-10-CM

## 2024-01-23 PROCEDURE — 99213 OFFICE O/P EST LOW 20 MIN: CPT

## 2024-01-23 NOTE — PLAN
[TextEntry] : Repeat pap done  Discussed treatment options with patient including a repeat course of 5FU vs Aldara for high grade SEYMOUR. f/u visit in 6 months

## 2024-01-23 NOTE — HISTORY OF PRESENT ILLNESS
[FreeTextEntry1] : 63 year old patient of Dr. Deandre Bonilla presents for follow up of stage I A ECA. Patient had ZAIDA/BSO on January 2, 2020 for endometrial cancer Figo Stage 1 confined to a polyp. A pap by Dr. Bonilla revealed epithelial cell abnormality, HGSIL with HPV. She was treated with Efudex (5FU) cream intravaginally and her repeat pap in June 2021 and January 2022 was reported as epithelial cell abnormality c/w LGSIL. Her most recent pap from July 2022 confirms Persistent LGSIL. The patient is otherwise asymptomatic and her colposcopic exam reveals no lesions. She returns for her 6 month follow up.  Pap 1/6/23: EPITHELIAL CELL ABNORMALITY Low grade squamous intraepithelial lesion (LSIL)  Pt last seen in GYN/ONC 07/18/23 where Pap was repeated.  Pap 07/18/23 Final Diagnosis EPITHELIAL CELL ABNORMALITY Low grade squamous intraepithelial lesion (LSIL)  HPV 16 Detected (07/18/23) HPV 18/45 not detected  Patient presents today for 6 month follow up.

## 2024-01-23 NOTE — PHYSICAL EXAM
[Chaperone Present] : A chaperone was present in the examining room during all aspects of the physical examination [TextEntry] : Well - developed, well-nourished female in no acute distress HEENT - wnl Breasts - symmetrical w/o masses or nipple discharge Abdomen - soft, non-tender, +BS Incisions - well healed Back - no CVA tenderness or spinal tenderness Extremities - w/o clubbing, cyanosis, no lesions noted Neuro - AAOx3  Pelvic Exam External Genitalia - with out lesions Vagina - mucosa atrophic, cuff intact and well supported, no lesions seen Cervix - surgically absent Uterus - surgically absent Adnexa - surgically absent Rectovaginal - confirmatory

## 2024-01-23 NOTE — ASSESSMENT
[FreeTextEntry1] : 63 year old patient of Dr. Deandre Bonilla presents for follow up of stage I A ECA. Patient had ZAIDA/BSO on January 2, 2020 for endometrial cancer Figo Stage 1 confined to a polyp. A pap by Dr. Bonilla revealed epithelial cell abnormality, HGSIL with HPV. She was treated with Efudex (5FU) cream intravaginally and her repeat pap in June 2021 and January 2022 was reported as epithelial cell abnormality c/w LGSIL. Her most recent pap from July 2022 confirms Persistent LGSIL. The patient is otherwise asymptomatic and her colposcopic exam reveals no lesions. She returns for her 6 month follow up, her most recent pap from 7-2023 reveals persistent LGSIL, but no lesions seen.

## 2024-01-26 LAB — HPV HIGH+LOW RISK DNA PNL CVX: DETECTED

## 2024-02-20 LAB — CYTOLOGY CVX/VAG DOC THIN PREP: ABNORMAL

## 2024-07-23 ENCOUNTER — LABORATORY RESULT (OUTPATIENT)
Age: 64
End: 2024-07-23

## 2024-07-23 ENCOUNTER — APPOINTMENT (OUTPATIENT)
Dept: GYNECOLOGIC ONCOLOGY | Facility: CLINIC | Age: 64
End: 2024-07-23
Payer: COMMERCIAL

## 2024-07-23 DIAGNOSIS — Z90.710 ACQUIRED ABSENCE OF BOTH CERVIX AND UTERUS: ICD-10-CM

## 2024-07-23 DIAGNOSIS — N89.0 MILD VAGINAL DYSPLASIA: ICD-10-CM

## 2024-07-23 DIAGNOSIS — Z85.42 PERSONAL HISTORY OF MALIGNANT NEOPLASM OF OTHER PARTS OF UTERUS: ICD-10-CM

## 2024-07-23 PROCEDURE — 99459 PELVIC EXAMINATION: CPT

## 2024-07-23 PROCEDURE — 99214 OFFICE O/P EST MOD 30 MIN: CPT

## 2024-07-23 NOTE — PHYSICAL EXAM
[Chaperone Present] : A chaperone was present in the examining room during all aspects of the physical examination [91932] : A chaperone was present during the pelvic exam. [FreeTextEntry2] : Violet Alvarez [TextEntry] : Well - developed, well-nourished female in no acute distress HEENT - wnl Breasts - symmetrical w/o masses or nipple discharge Abdomen - soft, non-tender, +BS Incisions - well healed Back - no CVA tenderness or spinal tenderness Extremities - w/o clubbing, cyanosis, no lesions noted Neuro - AAOx3  Pelvic Exam External Genitalia - with out lesions Vagina - mucosa atrophic, cuff intact and well supported. Cervix - surgically absent Uterus - surgically absent Adnexa - surgically absent Rectovaginal - confirmatory

## 2024-07-23 NOTE — PHYSICAL EXAM
[Chaperone Present] : A chaperone was present in the examining room during all aspects of the physical examination [[id number]] : A chaperone was present during the pelvic exam. [FreeTextEntry2] : Violet Alvarez [TextEntry] : Well - developed, well-nourished female in no acute distress HEENT - wnl Breasts - symmetrical w/o masses or nipple discharge Abdomen - soft, non-tender, +BS Incisions - well healed Back - no CVA tenderness or spinal tenderness Extremities - w/o clubbing, cyanosis, no lesions noted Neuro - AAOx3  Pelvic Exam External Genitalia - with out lesions Vagina - mucosa atrophic, cuff intact and well supported. Cervix - surgically absent Uterus - surgically absent Adnexa - surgically absent Rectovaginal - confirmatory

## 2024-07-23 NOTE — PHYSICAL EXAM
[Chaperone Present] : A chaperone was present in the examining room during all aspects of the physical examination [[id number]] : A chaperone was present during the pelvic exam. [FreeTextEntry2] : Violet Alvarez [TextEntry] : Well - developed, well-nourished female in no acute distress HEENT - wnl Breasts - symmetrical w/o masses or nipple discharge Abdomen - soft, non-tender, +BS Incisions - well healed Back - no CVA tenderness or spinal tenderness Extremities - w/o clubbing, cyanosis, no lesions noted Neuro - AAOx3  Pelvic Exam External Genitalia - with out lesions Vagina - mucosa atrophic, cuff intact and well supported. Cervix - surgically absent Uterus - surgically absent Adnexa - surgically absent Rectovaginal - confirmatory

## 2024-07-23 NOTE — PLAN
[TextEntry] : Pap done  Hpv testing discussed with patient F/U visit in 6 months We will treat  >or = to Vain II Otherwise, clinical f/u is indicated

## 2024-07-23 NOTE — HISTORY OF PRESENT ILLNESS
[FreeTextEntry1] : 64 year old patient of Dr. Deandre Bonilla presents for follow up of stage I A ECA. Patient had ZAIDA/BSO on January 2, 2020 for endometrial cancer Figo Stage 1 confined to a polyp. A pap by Dr. Bonilla revealed epithelial cell abnormality, HGSIL with HPV. She was treated with Efudex (5FU) cream intravaginally and her repeat pap in June 2021 and January 2022 was reported as epithelial cell abnormality c/w LGSIL. Her most recent pap from July 2022 confirms Persistent LGSIL. The patient is otherwise asymptomatic and her colposcopic exam reveals no lesions. She returns for her 6 month follow up.  Pap 1/6/23: EPITHELIAL CELL ABNORMALITY Low grade squamous intraepithelial lesion (LSIL)  Pap 07/18/23 Final Diagnosis EPITHELIAL CELL ABNORMALITY Low grade squamous intraepithelial lesion (LSIL) HPV 16 Detected (07/18/23) HPV 18/45 not detected  Last seen in GYN/ONC on 01/23/24 where pap was repeated.  EPITHELIAL CELL ABNORMALITY. (01/23/24) Low grade squamous intraepithelial lesion (LSIL) HPV 16 detected   Patient presents today for 6 month follow up.

## 2024-07-23 NOTE — ASSESSMENT
[FreeTextEntry1] : 4 year old patient of Dr. Deandre Bonilla presents for follow up of stage I A ECA. Patient had ZAIDA/BSO on January 2, 2020 for endometrial cancer Figo Stage 1 confined to a polyp. A pap by Dr. Bonilla revealed epithelial cell abnormality, HGSIL with HPV. She was treated with Efudex (5FU) cream intravaginally and her repeat pap in June 2021 and January 2022 was reported as epithelial cell abnormality c/w LGSIL. Her most recent pap from July 2022 confirms Persistent LGSIL. The patient is otherwise asymptomatic and her colposcopic exam reveals no lesions. She returns for her 6 month follow up.

## 2024-07-30 LAB — CYTOLOGY CVX/VAG DOC THIN PREP: ABNORMAL

## 2024-08-21 ENCOUNTER — NON-APPOINTMENT (OUTPATIENT)
Age: 64
End: 2024-08-21

## 2024-09-17 NOTE — ASU PREOP CHECKLIST - INTERNAL PROSTHESES
Detail Level: Detailed Patient Specific Counseling (Will Not Stick From Patient To Patient): Pt to continue using Aquaphor to open area where the skin denuded. Importance of strict sunprotection to the area was also discussed. \\n\\nOnce open area has reepithelialized, pt was given Silagen scar gel to apply to the area.  no

## 2024-09-20 ENCOUNTER — LABORATORY RESULT (OUTPATIENT)
Age: 64
End: 2024-09-20

## 2024-09-20 ENCOUNTER — APPOINTMENT (OUTPATIENT)
Dept: GYNECOLOGIC ONCOLOGY | Facility: CLINIC | Age: 64
End: 2024-09-20
Payer: COMMERCIAL

## 2024-09-20 ENCOUNTER — TRANSCRIPTION ENCOUNTER (OUTPATIENT)
Age: 64
End: 2024-09-20

## 2024-09-20 VITALS
DIASTOLIC BLOOD PRESSURE: 57 MMHG | RESPIRATION RATE: 15 BRPM | HEIGHT: 62 IN | WEIGHT: 208 LBS | BODY MASS INDEX: 38.28 KG/M2 | HEART RATE: 75 BPM | SYSTOLIC BLOOD PRESSURE: 153 MMHG

## 2024-09-20 DIAGNOSIS — N89.1 MODERATE VAGINAL DYSPLASIA: ICD-10-CM

## 2024-09-20 PROCEDURE — 99459 PELVIC EXAMINATION: CPT

## 2024-09-20 PROCEDURE — 99214 OFFICE O/P EST MOD 30 MIN: CPT

## 2024-09-20 RX ORDER — IMIQUIMOD 50 MG/G
5 CREAM TOPICAL
Qty: 36 | Refills: 3 | Status: ACTIVE | COMMUNITY
Start: 2024-09-20 | End: 1900-01-01

## 2024-09-20 NOTE — ASSESSMENT
[FreeTextEntry1] : 64 year old patient of Dr. Deandre Bonilla presents for follow up of stage I A ECA. Patient had ZAIDA/BSO on January 2, 2020 for endometrial cancer Figo Stage 1 confined to a polyp. A pap by Dr. Bonilla revealed epithelial cell abnormality, HGSIL with HPV. She was treated with Efudex (5FU) cream intravaginally and her repeat pap in June 2021 and January 2022 was reported as epithelial cell abnormality c/w LGSIL. Her most recent pap from July 2022 confirms Persistent LGSIL.  Pap 07/23/24 EPITHELIAL CELL ABNORMALITY. - Atypical squamous cells: Cannot exclude high grade squamous intraepithelial lesion (ASC-H), in a background of low grade squamous intraepithelial lesion (LSIL). HPV 16-detected HPV 18/45-not detected

## 2024-09-20 NOTE — PHYSICAL EXAM
[Chaperone Present] : A chaperone was present in the examining room during all aspects of the physical examination [[id number]] : A chaperone was present during the pelvic exam. [FreeTextEntry2] : Vianca Giron [TextEntry] : Well - developed, well-nourished female in no acute distress HEENT - wnl Breasts - symmetrical w/o masses or nipple discharge Abdomen - soft, non-tender, +BS Incisions - well healed Back - no CVA tenderness or spinal tenderness Extremities - w/o clubbing, cyanosis, no lesions noted Neuro - AAOx3  Pelvic Exam External Genitalia - with out lesions Vagina - mucosa atrophic, cuff intact and well supported. No gross lesions noted. Cervix - surgically absent Uterus - surgically absent Adnexa - surgically absent Rectovaginal - confirmatory

## 2024-09-20 NOTE — HISTORY OF PRESENT ILLNESS
[FreeTextEntry1] : 64 year old patient of Dr. Deandre Bonilla presents for follow up of stage I A ECA. Patient had ZAIDA/BSO on January 2, 2020 for endometrial cancer Figo Stage 1 confined to a polyp. A pap by Dr. Bonilla revealed epithelial cell abnormality, HGSIL with HPV. She was treated with Efudex (5FU) cream intravaginally and her repeat pap in June 2021 and January 2022 was reported as epithelial cell abnormality c/w LGSIL. Her most recent pap from July 2022 confirms Persistent LGSIL. The patient is otherwise asymptomatic and her colposcopic exam reveals no lesions. She returns for her 6 month follow up.  Pap 1/6/23: EPITHELIAL CELL ABNORMALITY Low grade squamous intraepithelial lesion (LSIL)  Pap 07/18/23 Final Diagnosis EPITHELIAL CELL ABNORMALITY Low grade squamous intraepithelial lesion (LSIL) HPV 16 Detected (07/18/23) HPV 18/45 not detected  Last seen in GYN/ONC on 01/23/24 where pap was repeated.  EPITHELIAL CELL ABNORMALITY. (01/23/24) Low grade squamous intraepithelial lesion (LSIL) HPV 16 detected  Last seen in GYN/ONC on 07/23/24, pap repeated.  Pap 07/23/24 EPITHELIAL CELL ABNORMALITY.    - Atypical squamous cells: Cannot exclude high grade squamous intraepithelial lesion   (ASC-H), in a background of low grade squamous intraepithelial lesion (LSIL). HPV 16-detected HPV 18/45-not detected  Presents today for follow up from pap results July 2023. She continues to Shed HPV 16 and is amenable to immune modulating treatment.

## 2024-09-20 NOTE — PLAN
[TextEntry] : Will start Imiquimod treatmet 3x/week for 4months to address persistent HPV 16 shedding. F/U visit after above

## 2024-09-20 NOTE — PHYSICAL EXAM
[Chaperone Present] : A chaperone was present in the examining room during all aspects of the physical examination [[id number]] : A chaperone was present during the pelvic exam. [FreeTextEntry2] : Vianca Giron [TextEntry] : Well - developed, well-nourished female in no acute distress HEENT - wnl Breasts - symmetrical w/o masses or nipple discharge Abdomen - soft, non-tender, +BS Incisions - well healed Back - no CVA tenderness or spinal tenderness Extremities - w/o clubbing, cyanosis, no lesions noted Neuro - AAOx3  Pelvic Exam External Genitalia - with out lesions Vagina - mucosa atrophic, cuff intact and well supported. No gross lesions noted. Cervix - surgically absent Uterus - surgically absent Adnexa - surgically absent Rectovaginal - confirmatory

## 2024-10-08 ENCOUNTER — NON-APPOINTMENT (OUTPATIENT)
Age: 64
End: 2024-10-08

## 2024-10-15 ENCOUNTER — APPOINTMENT (OUTPATIENT)
Dept: GYNECOLOGIC ONCOLOGY | Facility: CLINIC | Age: 64
End: 2024-10-15
Payer: COMMERCIAL

## 2024-10-15 DIAGNOSIS — N39.0 URINARY TRACT INFECTION, SITE NOT SPECIFIED: ICD-10-CM

## 2024-10-15 DIAGNOSIS — N89.1 MODERATE VAGINAL DYSPLASIA: ICD-10-CM

## 2024-10-15 PROCEDURE — 99214 OFFICE O/P EST MOD 30 MIN: CPT

## 2024-10-15 PROCEDURE — 99459 PELVIC EXAMINATION: CPT

## 2024-10-15 RX ORDER — SULFAMETHOXAZOLE AND TRIMETHOPRIM 800; 160 MG/1; MG/1
800-160 TABLET ORAL TWICE DAILY
Qty: 14 | Refills: 0 | Status: ACTIVE | COMMUNITY
Start: 2024-10-15 | End: 1900-01-01

## 2024-10-15 RX ORDER — FLUOROURACIL 50 MG/G
5 CREAM TOPICAL TWICE DAILY
Qty: 1 | Refills: 1 | Status: ACTIVE | COMMUNITY
Start: 2024-10-15 | End: 1900-01-01

## 2024-11-06 ENCOUNTER — APPOINTMENT (OUTPATIENT)
Dept: GYNECOLOGIC ONCOLOGY | Facility: CLINIC | Age: 64
End: 2024-11-06
Payer: COMMERCIAL

## 2024-11-06 ENCOUNTER — NON-APPOINTMENT (OUTPATIENT)
Age: 64
End: 2024-11-06

## 2024-11-06 VITALS
DIASTOLIC BLOOD PRESSURE: 83 MMHG | HEIGHT: 62 IN | BODY MASS INDEX: 38.64 KG/M2 | TEMPERATURE: 98.1 F | OXYGEN SATURATION: 94 % | WEIGHT: 210 LBS | HEART RATE: 81 BPM | SYSTOLIC BLOOD PRESSURE: 156 MMHG

## 2024-11-06 DIAGNOSIS — N89.1 MODERATE VAGINAL DYSPLASIA: ICD-10-CM

## 2024-11-06 PROCEDURE — 99215 OFFICE O/P EST HI 40 MIN: CPT

## 2024-12-27 ENCOUNTER — NON-APPOINTMENT (OUTPATIENT)
Age: 64
End: 2024-12-27

## 2025-01-17 ENCOUNTER — APPOINTMENT (OUTPATIENT)
Dept: GYNECOLOGIC ONCOLOGY | Facility: CLINIC | Age: 65
End: 2025-01-17

## 2025-03-18 ENCOUNTER — APPOINTMENT (OUTPATIENT)
Dept: GYNECOLOGIC ONCOLOGY | Facility: CLINIC | Age: 65
End: 2025-03-18
Payer: COMMERCIAL

## 2025-03-18 ENCOUNTER — LABORATORY RESULT (OUTPATIENT)
Age: 65
End: 2025-03-18

## 2025-03-18 VITALS
SYSTOLIC BLOOD PRESSURE: 168 MMHG | HEART RATE: 81 BPM | HEIGHT: 62 IN | DIASTOLIC BLOOD PRESSURE: 100 MMHG | WEIGHT: 210 LBS | BODY MASS INDEX: 38.64 KG/M2

## 2025-03-18 DIAGNOSIS — N89.1 MODERATE VAGINAL DYSPLASIA: ICD-10-CM

## 2025-03-18 DIAGNOSIS — Z85.42 PERSONAL HISTORY OF MALIGNANT NEOPLASM OF OTHER PARTS OF UTERUS: ICD-10-CM

## 2025-03-18 PROCEDURE — 99459 PELVIC EXAMINATION: CPT

## 2025-03-18 PROCEDURE — 99213 OFFICE O/P EST LOW 20 MIN: CPT

## 2025-03-19 LAB — HPV HIGH+LOW RISK DNA PNL CVX: DETECTED

## 2025-03-27 LAB — CYTOLOGY CVX/VAG DOC THIN PREP: ABNORMAL

## 2025-04-07 ENCOUNTER — NON-APPOINTMENT (OUTPATIENT)
Age: 65
End: 2025-04-07